# Patient Record
Sex: FEMALE | Race: WHITE | NOT HISPANIC OR LATINO | Employment: FULL TIME | URBAN - METROPOLITAN AREA
[De-identification: names, ages, dates, MRNs, and addresses within clinical notes are randomized per-mention and may not be internally consistent; named-entity substitution may affect disease eponyms.]

---

## 2017-08-24 ENCOUNTER — ALLSCRIPTS OFFICE VISIT (OUTPATIENT)
Dept: OTHER | Facility: OTHER | Age: 22
End: 2017-08-24

## 2017-08-24 DIAGNOSIS — R14.0 ABDOMINAL DISTENSION (GASEOUS): ICD-10-CM

## 2017-08-24 DIAGNOSIS — K62.5 HEMORRHAGE OF ANUS AND RECTUM: ICD-10-CM

## 2017-08-24 DIAGNOSIS — R19.8 OTHER SPECIFIED SYMPTOMS AND SIGNS INVOLVING THE DIGESTIVE SYSTEM AND ABDOMEN: ICD-10-CM

## 2017-10-24 NOTE — CONSULTS
Assessment  1  Abdominal bloating (787 3) (R14 0)   2  Rectal bleeding (569 3) (K62 5)   3  Alternating constipation and diarrhea (787 99) (R19 8)   4  Chronic GERD (530 81) (K21 9)    Plan  Abdominal bloating    · Dicyclomine HCl - 10 MG Oral Capsule; TAKE 1 CAPSULE EVERY 6 HOURS AS  NEEDED   Rx By: Chilango Duran; Dispense: 30 Days ; #:120 Capsule; Refill: 3;For: Abdominal bloating; SUSANNAH = N; Verified Transmission to [address] [place] [address]; Last Updated By: System, zanda; 8/24/2017 9:23:31 AM   · (1) C  DIFFICILE TOXIN BY PCR; Status:Active; Requested for:75Lgj3391;    Perform:[facility]; MCO:[id number]; Ordered; For:Abdominal bloating; Ordered By:Charlette Hsieh;   · (1) COMPREHENSIVE METABOLIC PANEL; Status:Active; Requested for:38Zlu2150;    Perform:[facility]; KPE:[id number]; Ordered; For:Abdominal bloating; Ordered By:Charlette Hsieh;   · (1) C-REACTIVE PROTEIN; Status:Active; Requested for:22Kdg6490;    Perform:[facility]; KPU:[id number]; Ordered; For:Abdominal bloating; Ordered By:Charlette Hsieh;   · (1) SED RATE; Status:Active; Requested for:52Auy3958;    Perform:[facility]; LMU:[id number]; Ordered; For:Abdominal bloating; Ordered By:Michelle Hsieh; Alternating constipation and diarrhea    · (1) CELIAC DISEASE AB PROFILE; Status:Active; Requested for:11Rut6599;    Perform:[facility]; DGC:[id number]; Ordered; For:Alternating constipation and diarrhea; Ordered By:Charlette Hsieh;   · (1) HELICOBACTER PYLORI ANTIGEN, STOOL; Status:Active; Requested  for:90Kjv0395;    Perform:[facility]; WGP:[id number]; Ordered; For:Alternating constipation and diarrhea; Ordered By:Charlette Hsieh;   · (1) OVA AND PARASITES EXAM; Status:Active; Requested for:68Gom0409;    Perform:[facility]; SLW:[id number]; Ordered; For:Alternating constipation and diarrhea; Ordered By:Charlette Hsieh;   · (1) SED RATE; Status:Active;  Requested for:90Nse0093; Perform:[facility]; PYR:[id number]; Ordered; For:Alternating constipation and diarrhea; Ordered By:Charlette Hsieh;   · (1) STOOL CULTURE; Source:Rectum; Status:Active; Requested for:24Aug2017;    Perform:[facility]; VOA:[id number]; Ordered; For:Alternating constipation and diarrhea; Ordered By:Charlette Hsieh;   · (1) TSH; Status:Active; Requested for:24Aug2017;    Perform:[facility]; ZJZ:[id number]; Ordered; For:Alternating constipation and diarrhea; Ordered By:Charlette Hsieh;   · COLONOSCOPY (GI, SURG); Status:Hold For - Scheduling; Requested for:24Aug2017;    Perform:[facility]; Due:54Igc2864; Ordered; For:Alternating constipation and diarrhea; Ordered By:Charlette Hsieh;  Chronic GERD    · Omeprazole 40 MG Oral Capsule Delayed Release; take 1 capsule daily   Rx By: Oliver Cervantes; Dispense: 30 Days ; #:30 Capsule Delayed Release; Refill: 2;For: Chronic GERD; SUSANNAH = N; Verified Transmission to [address] [place] [address]; Last Updated By: [employer]; 8/24/2017 9:23:32 AM  Rectal bleeding    · (1) CBC/ PLT (NO DIFF); Status:Active; Requested for:24Aug2017;    Perform:[facility]; APH:[id number]; Ordered; For:Rectal bleeding; Ordered By:Charlette Hsieh;    Discussion/Summary  Discussion Summary:   1  Abdominal pain, bloating, diarrhea with occasional rectal bleeding-suspect irritable bowel syndrome versus inflammatory bowel disease  Check inflammatory markers, celiac serologies  CBC, CMP, TSH history of C diff infection, will check stool studies  However, patient has not had any recent antibiotic use, no recent hospitalization or sick contacts, therefore the likelihood of recurrent C diff is low  colonoscopy to assess for inflammatory bowel disease given history of C diff and occasional rectal bleeding  recommended to start a probiotic give trial of dicyclomine 10 mg q 6 hours p r n  FODMAP diet  follow up in 4 months        Chief Complaint  Chief Complaint Free Text Note Form: Abdominal pain, diarrhea, bloating  History of Present Illness  HPI: 72-year-old female presents for initial evaluation of heartburn symptoms, abdominal pain, bloating and diarrhea  Patient states that she has had intermittent episodes of heartburn, predominantly in the mornings at least 2 to 3 times a week for the past 6 months  She also endorses a cramping abdominal pain in the lower abdomen, bloating along with multiple episodes of loose stools a throughout the day  She states that her bowel movements are usually postprandial, sometimes a containing fresh blood  She she denies any nighttime symptoms  Denies NSAID use  Denies any unintentional weight loss  She does not know family history as patient is adopted  denies jaundice, pruritus, right upper quadrant abdominal pain  note, patient was admitted to [facility] with C diff infection  Review of Systems  Complete-Female GI Adult:   Constitutional: No fever, no chills, feels well, no tiredness, no recent weight gain or weight loss  Eyes: No complaints of eye pain, no red eyes, no eyesight problems, no discharge, no dry eyes, no itching of eyes  ENT: no complaints of earache, no loss of hearing, no nose bleeds, no nasal discharge, no sore throat, no hoarseness  Cardiovascular: No complaints of slow heart rate, no fast heart rate, no chest pain, no palpitations, no leg claudication, no lower extremity edema  Respiratory: No complaints of shortness of breath, no wheezing, no cough, no SOB on exertion, no orthopnea, no PND  Gastrointestinal: No complaints of abdominal pain, no constipation, no nausea or vomiting, no diarrhea, no bloody stools  Genitourinary: No complaints of dysuria, no incontinence, no pelvic pain, no dysmenorrhea, no vaginal discharge or bleeding  Musculoskeletal: No complaints of arthralgias, no myalgias, no joint swelling or stiffness, no limb pain or swelling     Integumentary: No complaints of skin rash or lesions, no itching, no skin wounds, no breast pain or lump  Neurological: No complaints of headache, no confusion, no convulsions, no numbness, no dizziness or fainting, no tingling, no limb weakness, no difficulty walking  Psychiatric: Not suicidal, no sleep disturbance, no anxiety or depression, no change in personality, no emotional problems  Endocrine: No complaints of proptosis, no hot flashes, no muscle weakness, no deepening of the voice, no feelings of weakness  Hematologic/Lymphatic: No complaints of swollen glands, no swollen glands in the neck, does not bleed easily, does not bruise easily  ROS Reviewed:   ROS reviewed  Active Problems  1  Pigmented nevus (216 9) (D22 9)   2  Screening for skin condition (V82 0) (Z13 89)    Past Medical History  1  History of Asthma (493 90) (J45 909)   2  History of allergy (V15 09) (Z88 9)   3  History of benign neoplasm of skin (V13 3) (Z87 2)   4  History of Screening for skin condition (V82 0) (Z13 89)  Active Problems And Past Medical History Reviewed: The active problems and past medical history were reviewed and updated today  Surgical History  Surgical History Reviewed: The surgical history was reviewed and updated today  Family History  Family History Reviewed: The family history was reviewed and updated today  Social History   · Never A Smoker  Social History Reviewed: The social history was reviewed and updated today  Current Meds   1  NuvaRing 0 12-0 015 MG/24HR Vaginal Ring; USE AS DIRECTED Recorded  Medication List Reviewed: The medication list was reviewed and updated today  Allergies  1   No Known Drug Allergies    Vitals  Vital Signs    Recorded: 25Irr7054 08:56AM   Heart Rate 72   Systolic 068   Diastolic 68   Height 5 ft 7 in   Weight 139 lb 8 0 oz   BMI Calculated 21 85   BSA Calculated 1 74     Physical Exam    Constitutional   General appearance: No acute distress, well appearing and well nourished  Eyes   Conjunctiva and lids: No swelling, erythema or discharge  Ears, Nose, Mouth, and Throat   External inspection of ears and nose: Normal     Pulmonary   Respiratory effort: No increased work of breathing or signs of respiratory distress  Auscultation of lungs: Clear to auscultation  Cardiovascular   Palpation of heart: Normal PMI, no thrills  Auscultation of heart: Normal rate and rhythm, normal S1 and S2, without murmurs  Examination of extremities for edema and/or varicosities: Normal     Abdomen   Abdomen: Non-tender, no masses  Liver and spleen: No hepatomegaly or splenomegaly  Lymphatic   Palpation of lymph nodes in neck: No lymphadenopathy  Skin   Skin and subcutaneous tissue: Normal without rashes or lesions      Psychiatric   Orientation to person, place, and time: Normal     Mood and affect: Normal          Future Appointments    Date/Time Provider Specialty Site   12/26/2017 09:30 AM Tracy Irene [facility] Adult [facility]     Signatures   Electronically signed by : BOUBACAR Agarwal ; Aug 24 2017  4:46PM EST                       (Author)

## 2018-01-13 VITALS
SYSTOLIC BLOOD PRESSURE: 100 MMHG | HEART RATE: 72 BPM | BODY MASS INDEX: 21.9 KG/M2 | DIASTOLIC BLOOD PRESSURE: 68 MMHG | HEIGHT: 67 IN | WEIGHT: 139.5 LBS

## 2021-04-08 DIAGNOSIS — Z23 ENCOUNTER FOR IMMUNIZATION: ICD-10-CM

## 2021-12-16 ENCOUNTER — NURSE TRIAGE (OUTPATIENT)
Dept: OTHER | Facility: OTHER | Age: 26
End: 2021-12-16

## 2021-12-16 DIAGNOSIS — Z20.822 ENCOUNTER FOR SCREENING LABORATORY TESTING FOR COVID-19 VIRUS: Primary | ICD-10-CM

## 2021-12-16 PROCEDURE — 87636 SARSCOV2 & INF A&B AMP PRB: CPT | Performed by: FAMILY MEDICINE
